# Patient Record
Sex: MALE | Race: OTHER | Employment: OTHER | URBAN - METROPOLITAN AREA
[De-identification: names, ages, dates, MRNs, and addresses within clinical notes are randomized per-mention and may not be internally consistent; named-entity substitution may affect disease eponyms.]

---

## 2017-02-21 NOTE — PATIENT DISCUSSION
POSTERIOR VITREOUS DETACHMENT, O__:  NO HOLES. NO TEARS. RETINAL  DETACHMENT WARNINGS DISCUSSED. FLOATERS AND FLASHES BROCHURE GIVEN. RETURN FOR FOLLOW-UP AS SCHEDULED.

## 2017-02-21 NOTE — PATIENT DISCUSSION
[place] OU:  PRESCRIBED UV PROTECTION TO SLOW GROWTH. PRESCRIBE ARTIFICAL TEARS TO INCREASE COMFORT.

## 2020-05-21 NOTE — PATIENT DISCUSSION
CATARACTS, OU: VISUALLY SIGNIFICANT. OPTION OF SURGERY VERSUS FOLLOWING VERSUS UPDATING GLASSES DISCUSSED. RBA'S DISCUSSED, PATIENT UNDERSTANDS AND DESIRES SURGERY TO INCREASE VISION FOR READING, WATCHING TV, AND TO REDUCE GLARE IN ORDER TO DRIVE SAFELY WHEN IT IS BRIGHT OUTSIDE. SCHEDULE CATARACT SURGERY/PRE-OP OU.

## 2020-06-01 NOTE — PATIENT DISCUSSION
New Prescription: prednisoln mv-mtsxvzti-fgqccye (prednisoln va-tsofejhu-vimmecc): drops: 1-0.5-0.075% 1 drop four times a day as directed into affected eye 06-

## 2020-06-01 NOTE — PATIENT DISCUSSION
CATARACT OD: RBA'S DISCUSSED, PATIENT UNDERSTANDS AND DESIRES TO PROCEED WITH SURGERY. CONSENT READ AND SIGNED. E-SCRIBED OMNI DROP. PATIENT DESIRES PANOPTIX TORIC.

## 2020-06-17 NOTE — PATIENT DISCUSSION
Continue: prednisoln th-gbrvqmej-ttcixrk (prednisoln zl-rmcqryio-vqefkyv): drops: 1-0.5-0.075% 1 drop four times a day as directed into affected eye 06-

## 2020-07-15 NOTE — PATIENT DISCUSSION
Continue: prednisoln qh-fxeivnsx-okghfya (prednisoln ui-ixumjtzb-qogylzr): drops: 1-0.5-0.075% 1 drop four times a day as directed into affected eye 06-

## 2021-06-14 NOTE — PATIENT DISCUSSION
Continue: PreserVision AREDS 2 (vit c,h-ck-pbaye-lutein-zeaxan): capsule: [id number] mg-unit-mg-mg

## 2022-06-29 NOTE — PATIENT DISCUSSION
Discussed findings with patient. Rx AREDS 2 and monitor at home with [id number] [facility]. Patient educated on importance of UV protection, healthy diet and no smoking. Advised patient to RTC immediately if vision worsen and grid appears darker, wavy or blank.

## 2022-07-11 ENCOUNTER — COMPREHENSIVE EXAM (OUTPATIENT)
Dept: URBAN - METROPOLITAN AREA CLINIC 35 | Facility: CLINIC | Age: 42
End: 2022-07-11

## 2022-07-11 DIAGNOSIS — H52.7: ICD-10-CM

## 2022-07-11 PROCEDURE — 92015 DETERMINE REFRACTIVE STATE: CPT

## 2022-07-11 PROCEDURE — 92014 COMPRE OPH EXAM EST PT 1/>: CPT

## 2022-07-11 ASSESSMENT — TONOMETRY
OS_IOP_MMHG: 18
OD_IOP_MMHG: 18

## 2022-07-11 ASSESSMENT — VISUAL ACUITY
OS_SC: J1
OD_SC: 20/20
OS_SC: 20/20
OD_SC: J1
